# Patient Record
Sex: FEMALE | Race: BLACK OR AFRICAN AMERICAN | URBAN - METROPOLITAN AREA
[De-identification: names, ages, dates, MRNs, and addresses within clinical notes are randomized per-mention and may not be internally consistent; named-entity substitution may affect disease eponyms.]

---

## 2019-06-14 ENCOUNTER — OFFICE VISIT (OUTPATIENT)
Dept: ENDOCRINOLOGY | Age: 19
End: 2019-06-14

## 2019-06-14 VITALS
DIASTOLIC BLOOD PRESSURE: 73 MMHG | BODY MASS INDEX: 39.68 KG/M2 | SYSTOLIC BLOOD PRESSURE: 143 MMHG | OXYGEN SATURATION: 100 % | HEART RATE: 85 BPM | RESPIRATION RATE: 14 BRPM | HEIGHT: 72 IN | TEMPERATURE: 97.4 F | WEIGHT: 293 LBS

## 2019-06-14 DIAGNOSIS — E06.3 HASHIMOTO'S DISEASE: Primary | ICD-10-CM

## 2019-06-14 DIAGNOSIS — E03.8 SUBCLINICAL HYPOTHYROIDISM: ICD-10-CM

## 2019-06-14 RX ORDER — MONTELUKAST SODIUM 10 MG/1
1 TABLET ORAL AS NEEDED
Refills: 6 | COMMUNITY
Start: 2019-03-22

## 2019-06-14 NOTE — LETTER
6/15/19 Patient: Lala Mancera YOB: 2000 Date of Visit: 6/14/2019 Lindsey Horne NP 
[address] [address] VIA Facsimile: [phone] Dear Lindsey Horne NP, Thank you for referring Ms. Gisela Kern to [facility] for evaluation. My notes for this consultation are attached. If you have questions, please do not hesitate to call me. I look forward to following your patient along with you. Sincerely, Kaitlin Robledo MD 
 

negative - no change in level of consciousness

## 2019-06-14 NOTE — PROGRESS NOTES
Cheri Martin is a 19 y.o. female here for   Chief Complaint   Patient presents with    New Patient     referred by ARUN Kemp for Thyroid       1. Have you been to the ER, urgent care clinic since your last visit? Hospitalized since your last visit? -n/a    2. Have you seen or consulted any other health care providers outside of the [address] [place] [address] since your last visit?   Include any pap smears or colon screening.-n/a

## 2019-06-14 NOTE — PROGRESS NOTES
[facility]               China Johnson MD        [address] [phone] Fax [phone]           Patient Information  Date:6/15/2019  Name : Lita Portillo 19 y.o.     YOB: 2000         Referred by: Nazanin Givens NP         Chief Complaint   Patient presents with   [facility] New Patient     referred by ARUN Olivia for Thyroid       History of present illness    Gisela Regan is a 19 y.o. female  here for evaluation of thyroid. She was found to have elevated TPO in 400, TSH was 6, repeat TSH was 4.6, free T4 normal  She has strong family history of thyroid disease. She has always been overweight, no acute weight gain, no extreme fatigue, constipation. Complains of dry skin  Recently she has changed the diet  Menstrual cycles are regular  No exogenous glucocorticoids  No regular exercise now but planning to resume soon      Wt Readings from Last 3 Encounters:   06/14/19 326 lb (147.9 kg)       History reviewed. No pertinent past medical history. Current Outpatient Medications   Medication Sig    montelukast (SINGULAIR) 10 mg tablet Take 1 Tab by mouth as needed. No current facility-administered medications for this visit. Review of Systems:  - Constitutional Symptoms: no fevers, chills, weight loss  - Eyes: no blurry vision or double vision  - Cardiovascular: no chest pain no palpitations  - Respiratory: no cough or shortness of breath  - Gastrointestinal: no dysphagia or abdominal pain  - Musculoskeletal: no joint pains or weakness  - Integumentary: no rashes  - Neurological: no numbness, tingling, or headaches  - Psychiatric: no depression or anxiety  - Endocrine: no heat or cold intolerance, no polyuria or polydipsia    Physical Examination:  Blood pressure 143/73, pulse 85, temperature 97.4 °F (36.3 °C), temperature source Oral, resp. rate 14, height 6' (1.829 m), weight 326 lb (147.9 kg), SpO2 100 %.     Body mass index is 44.21 kg/m². - General: pleasant, no distress, good eye contact  - HEENT: no exopthalmos, no periorbital edema, no scleral/conjunctival injection, EOMI, no lid lag or stare  - Neck: supple, no thyromegaly, nodules, lymph nodes,   - Cardiovascular: regular,  normal S1 and S2, no murmurs  - Respiratory: clear to auscultation bilaterally  - Gastrointestinal: soft, nontender, nondistended, BS +  - Musculoskeletal: no proximal muscle weakness in upper or lower extremities  - Integumentary: No tremors, no edema, + acanthosis nigricans  - Neurological: alert and oriented   - Psychiatric: normal mood and affect  - Skin - normal turgor    Data Reviewed:       [] Reviewed lab  s    Assessment/Plan:     1. Hashimoto's disease    2. Subclinical hypothyroidism      She has autoimmune thyroid disease, TSH is very minimally off, there is no benefit in treating now. Subclinical hypothyroidism: Treatment is indicated if patient is planning pregnancy. Discussed the natural course of autoimmune thyroid disease/Hashimoto's thyroiditis, positive antibody indicates future risk of hypothyroidism. Recheck TSH, free T4 in 6 months. If normal annual TSH. Discussed the symptoms of hypothyroidism    Morbid obesity: Discussed about calorie restriction low-carb diet, sleep hygiene, increase activity  Insulin resistance: No diabetes according to A1c        Follow-up and Dispositions    · Return in about 6 months (around 12/14/2019) for labs before next visit and follow up. Thank you for allowing me to participate in the care of this patient. Lynsey Durham MD      Patient Vikram Ram verbalized understanding  Voice-recognition software was used to generate this report, which may result in some phonetic-based errors in the grammar and contents. Even though attempts were made to correct all the mistakes, some may have been missed and remained in the body of the report.